# Patient Record
Sex: MALE | Race: WHITE
[De-identification: names, ages, dates, MRNs, and addresses within clinical notes are randomized per-mention and may not be internally consistent; named-entity substitution may affect disease eponyms.]

---

## 2020-06-22 ENCOUNTER — HOSPITAL ENCOUNTER (EMERGENCY)
Dept: HOSPITAL 63 - ER | Age: 70
Discharge: ANOTHER HOSPITAL | End: 2020-06-22
Payer: MEDICARE

## 2020-06-22 VITALS — HEIGHT: 72 IN | WEIGHT: 140.21 LBS | BODY MASS INDEX: 18.99 KG/M2

## 2020-06-22 VITALS — SYSTOLIC BLOOD PRESSURE: 122 MMHG | DIASTOLIC BLOOD PRESSURE: 84 MMHG

## 2020-06-22 DIAGNOSIS — S06.0X0A: Primary | ICD-10-CM
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
## 2020-06-22 DIAGNOSIS — I48.91: ICD-10-CM
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
## 2020-06-22 DIAGNOSIS — Z86.73: ICD-10-CM

## 2020-06-22 LAB
ALBUMIN SERPL-MCNC: 3.2 G/DL (ref 3.4–5)
ALBUMIN/GLOB SERPL: 0.7 {RATIO} (ref 1–1.7)
ALP SERPL-CCNC: 92 U/L (ref 46–116)
ALT SERPL-CCNC: 24 U/L (ref 16–63)
ANION GAP SERPL CALC-SCNC: 17 MMOL/L (ref 6–14)
APTT PPP: YELLOW S
AST SERPL-CCNC: 32 U/L (ref 15–37)
BACTERIA #/AREA URNS HPF: (no result) /HPF
BASOPHILS # BLD AUTO: 0.1 X10^3/UL (ref 0–0.2)
BASOPHILS NFR BLD: 2 % (ref 0–3)
BILIRUB SERPL-MCNC: 1.4 MG/DL (ref 0.2–1)
BILIRUB UR QL STRIP: (no result)
BUN/CREAT SERPL: 13 (ref 6–20)
CA-I SERPL ISE-MCNC: 12 MG/DL (ref 8–26)
CALCIUM SERPL-MCNC: 8.8 MG/DL (ref 8.5–10.1)
CHLORIDE SERPL-SCNC: 105 MMOL/L (ref 98–107)
CO2 SERPL-SCNC: 18 MMOL/L (ref 21–32)
CREAT SERPL-MCNC: 0.9 MG/DL (ref 0.7–1.3)
EOSINOPHIL NFR BLD: 0.1 X10^3/UL (ref 0–0.7)
EOSINOPHIL NFR BLD: 2 % (ref 0–3)
ERYTHROCYTE [DISTWIDTH] IN BLOOD BY AUTOMATED COUNT: 14 % (ref 11.5–14.5)
FIBRINOGEN PPP-MCNC: (no result) MG/DL
GFR SERPLBLD BASED ON 1.73 SQ M-ARVRAT: 83.4 ML/MIN
GLOBULIN SER-MCNC: 4.5 G/DL (ref 2.2–3.8)
GLUCOSE SERPL-MCNC: 82 MG/DL (ref 70–99)
GLUCOSE UR STRIP-MCNC: (no result) MG/DL
HCT VFR BLD CALC: 34.5 % (ref 39–53)
HGB BLD-MCNC: 11.7 G/DL (ref 13–17.5)
LYMPHOCYTES # BLD: 0.6 X10^3/UL (ref 1–4.8)
LYMPHOCYTES NFR BLD AUTO: 13 % (ref 24–48)
MCH RBC QN AUTO: 34 PG (ref 25–35)
MCHC RBC AUTO-ENTMCNC: 34 G/DL (ref 31–37)
MCV RBC AUTO: 99 FL (ref 79–100)
MONO #: 0.6 X10^3/UL (ref 0–1.1)
MONOCYTES NFR BLD: 15 % (ref 0–9)
NEUT #: 2.8 X10^3UL (ref 1.8–7.7)
NEUTROPHILS NFR BLD AUTO: 68 % (ref 31–73)
NITRITE UR QL STRIP: (no result)
PLATELET # BLD AUTO: 88 X10^3/UL (ref 140–400)
PLATELET # BLD EST: (no result) 10*3/UL
POTASSIUM SERPL-SCNC: 3.3 MMOL/L (ref 3.5–5.1)
PROT SERPL-MCNC: 7.7 G/DL (ref 6.4–8.2)
RBC # BLD AUTO: 3.49 X10^6/UL (ref 4.3–5.7)
RBC #/AREA URNS HPF: (no result) /HPF (ref 0–2)
SODIUM SERPL-SCNC: 140 MMOL/L (ref 136–145)
SP GR UR STRIP: 1.02
SQUAMOUS #/AREA URNS LPF: (no result) /LPF
UROBILINOGEN UR-MCNC: 0.2 MG/DL
WBC # BLD AUTO: 4.2 X10^3/UL (ref 4–11)
WBC #/AREA URNS HPF: >40 /HPF (ref 0–4)

## 2020-06-22 PROCEDURE — 99285 EMERGENCY DEPT VISIT HI MDM: CPT

## 2020-06-22 PROCEDURE — 80053 COMPREHEN METABOLIC PANEL: CPT

## 2020-06-22 PROCEDURE — 36415 COLL VENOUS BLD VENIPUNCTURE: CPT

## 2020-06-22 PROCEDURE — 82947 ASSAY GLUCOSE BLOOD QUANT: CPT

## 2020-06-22 PROCEDURE — 81001 URINALYSIS AUTO W/SCOPE: CPT

## 2020-06-22 PROCEDURE — 71045 X-RAY EXAM CHEST 1 VIEW: CPT

## 2020-06-22 PROCEDURE — 70450 CT HEAD/BRAIN W/O DYE: CPT

## 2020-06-22 PROCEDURE — 84484 ASSAY OF TROPONIN QUANT: CPT

## 2020-06-22 PROCEDURE — 85025 COMPLETE CBC W/AUTO DIFF WBC: CPT

## 2020-06-22 PROCEDURE — 83880 ASSAY OF NATRIURETIC PEPTIDE: CPT

## 2020-06-22 PROCEDURE — 72125 CT NECK SPINE W/O DYE: CPT

## 2020-06-22 PROCEDURE — 87086 URINE CULTURE/COLONY COUNT: CPT

## 2020-06-22 PROCEDURE — 96365 THER/PROPH/DIAG IV INF INIT: CPT

## 2020-06-22 PROCEDURE — 93005 ELECTROCARDIOGRAM TRACING: CPT

## 2020-06-22 NOTE — RAD
CT head and cervical spine without contrast

 

History: Fall

 

Technique: Noncontrast CT imaging was performed of the head and cervical 

spine. Multiplanar reconstruction images are submitted.  Exposure: One or 

more of the following individualized dose reduction techniques were 

utilized for this examination:  1. Automated exposure control  2. 

Adjustment of the mA and/or kV according to patient size  3. Use of 

iterative reconstruction technique.

 

Head CT 

 

Comparison: None

 

Findings: There is motion degradation. There is some asymmetric 

hyperdensity of the right basal ganglia about 0.6 cm in size. There is 

focus of lower density extending near the cortical surface of the right 

parasagittal parietal occipital lobes. There is also area of lower density

of the left temporal lobe. Ventricular size is considered within normal 

limits. Visualized paranasal sinuses are aerated. Mastoid air cells are 

aerated. No acute calvarial abnormality is identified.

 

Impression:

1. Small focus of hyperdensity of the right basal ganglia is favored to be

due to asymmetric calcification rather than hemorrhage although could be 

followed up to assess stability. There is focus of lower density extending

to the cortical surface of the right parietal occipital lobes, subacute 

infarct possible although could be chronic, better assessed with MRI or 

short-term follow-up CT. There is also focus of lower density of the left 

temporal lobe, possibly chronic versus late subacute infarct.

 

Cervical spine CT 

 

Comparison:  None

 

Findings: No acute cervical spine fracture is identified. Cervical 

vertebral body stature is overall maintained. There is superior endplate 

concavity of T2 without osseous retropulsion, difficult to accurately 

determine age although favored to be older. There is accentuation of 

lordotic curvature cervical spine. There are minimal disc osteophyte 

complexes greatest C5-6 and C6-7. There is moderate to severe degenerative

disc disease greater posteriorly C4-5 through C6-7 and to lesser degree at

C3-4 and C2-3. There is likely borderline central canal stenosis about 10 

mm at C5-6. There is multilevel cervical facet degenerative change, also 

degree of uncovertebral degenerative change. There is moderate to severe 

narrowing of the right C4-5 and C6-7 neural foramina, severe narrowing on 

the right at C5-6, also moderate to severe narrowing on the left at C5-6 

and C6-7. There is mild levoscoliosis. There is small nonspecific lytic 

focus of the right C5 vertebral body about 0.4 cm otherwise too small to 

accurately characterize. There is atherosclerotic calcification of the 

left carotid artery in the neck.

 

Impression:

1. No acute cervical spine fracture is identified.

2. There is multilevel degenerative disc disease. There is spondylosis 

greatest at C5-6 and C6-7.

3. There is multilevel cervical neural foramina compromise due to facet 

and uncovertebral degenerative change.

 

Critical results were discussed with Dr. Rosado at 6/22/2020 1:13 PM.

 

Electronically signed by: Sharan Whiting MD (6/22/2020 1:16 PM) [id number]

## 2020-06-22 NOTE — EKG
[facility] [address], [place] [address]

Test Date:    2020               Test Time:    12:57:04

Pat Name:     EBONY STEINBERG             Department:   

Patient ID:   [id number]           Room:          

Gender:       M                        Technician:   

:          [id number]               Requested By: ESME DOMINGO

Order Number: [id number]            Reading MD:     

                                 Measurements

Intervals                              Axis          

Rate:         109                      P:            90

MN:           114                      QRS:          42

QRSD:         70                       T:            26

QT:           328                                    

QTc:          443                                    

                           Interpretive Statements

SINUS TACHYCARDIA

ATRIAL PREMATURE COMPLEX(ES)

NO SPECIFIC ECG ABNORMALITIES

RI6.02

No previous ECG available for comparison

## 2020-06-22 NOTE — RAD
EXAM: Chest, single view.

 

HISTORY: Syncope.

 

COMPARISON: None.

 

FINDINGS: A frontal view of the chest is obtained. There is diffuse 

increased interstitial opacity. There is no consolidation, pleural 

effusion or pneumothorax. The heart is normal in size. There are healed 

rib fractures.

 

IMPRESSION: Diffuse increased interstitial opacity due to interstitial 

infiltrate or chronic interstitial changes.

 

Electronically signed by: Kimberly Cash MD (6/22/2020 1:42 PM) [id number]